# Patient Record
Sex: FEMALE | Race: WHITE | NOT HISPANIC OR LATINO | Employment: FULL TIME | URBAN - METROPOLITAN AREA
[De-identification: names, ages, dates, MRNs, and addresses within clinical notes are randomized per-mention and may not be internally consistent; named-entity substitution may affect disease eponyms.]

---

## 2023-05-02 ENCOUNTER — OFFICE VISIT (OUTPATIENT)
Dept: PRIMARY CARE | Facility: CLINIC | Age: 43
End: 2023-05-02
Payer: COMMERCIAL

## 2023-05-02 VITALS
TEMPERATURE: 97.5 F | DIASTOLIC BLOOD PRESSURE: 74 MMHG | WEIGHT: 193 LBS | OXYGEN SATURATION: 98 % | BODY MASS INDEX: 32.95 KG/M2 | SYSTOLIC BLOOD PRESSURE: 114 MMHG | HEART RATE: 67 BPM | HEIGHT: 64 IN

## 2023-05-02 DIAGNOSIS — H60.501 ACUTE OTITIS EXTERNA OF RIGHT EAR, UNSPECIFIED TYPE: Primary | ICD-10-CM

## 2023-05-02 PROBLEM — S62.328A CLOSED FRACTURE OF SHAFT OF FOURTH METACARPAL BONE: Status: ACTIVE | Noted: 2023-05-02

## 2023-05-02 PROBLEM — H81.10 BENIGN POSITIONAL VERTIGO: Status: ACTIVE | Noted: 2023-05-02

## 2023-05-02 PROBLEM — G62.9 NEUROPATHY: Status: ACTIVE | Noted: 2023-05-02

## 2023-05-02 PROBLEM — Z98.84 S/P LAPAROSCOPIC SLEEVE GASTRECTOMY: Status: ACTIVE | Noted: 2023-05-02

## 2023-05-02 PROBLEM — G43.009 COMMON MIGRAINE WITHOUT AURA: Status: ACTIVE | Noted: 2023-05-02

## 2023-05-02 PROBLEM — M79.671 CHRONIC PAIN OF RIGHT HEEL: Status: ACTIVE | Noted: 2023-05-02

## 2023-05-02 PROBLEM — K21.9 ESOPHAGEAL REFLUX: Status: ACTIVE | Noted: 2023-05-02

## 2023-05-02 PROBLEM — S01.332A: Status: ACTIVE | Noted: 2023-05-02

## 2023-05-02 PROBLEM — S01.331A: Status: ACTIVE | Noted: 2023-05-02

## 2023-05-02 PROBLEM — E55.9 VITAMIN D DEFICIENCY: Status: ACTIVE | Noted: 2023-05-02

## 2023-05-02 PROBLEM — G89.29 CHRONIC PAIN OF RIGHT HEEL: Status: ACTIVE | Noted: 2023-05-02

## 2023-05-02 PROBLEM — G47.33 OBSTRUCTIVE SLEEP APNEA, ADULT: Status: ACTIVE | Noted: 2023-05-02

## 2023-05-02 PROBLEM — Z98.84 BARIATRIC SURGERY STATUS: Status: ACTIVE | Noted: 2023-05-02

## 2023-05-02 PROBLEM — E03.9 ACQUIRED HYPOTHYROIDISM: Status: ACTIVE | Noted: 2023-05-02

## 2023-05-02 PROBLEM — F54 PSYCHOLOGICAL FACTOR AFFECTING PHYSICAL CONDITION: Status: ACTIVE | Noted: 2023-05-02

## 2023-05-02 PROBLEM — G44.221 CHRONIC TENSION-TYPE HEADACHE, INTRACTABLE: Status: ACTIVE | Noted: 2023-05-02

## 2023-05-02 PROBLEM — R53.83 FATIGUE: Status: ACTIVE | Noted: 2023-05-02

## 2023-05-02 PROBLEM — L25.5 RHUS DERMATITIS: Status: ACTIVE | Noted: 2023-05-02

## 2023-05-02 PROCEDURE — 1036F TOBACCO NON-USER: CPT | Performed by: INTERNAL MEDICINE

## 2023-05-02 PROCEDURE — 99213 OFFICE O/P EST LOW 20 MIN: CPT | Performed by: INTERNAL MEDICINE

## 2023-05-02 RX ORDER — NEOMYCIN SULFATE, POLYMYXIN B SULFATE, HYDROCORTISONE 3.5; 10000; 1 MG/ML; [USP'U]/ML; MG/ML
2 SOLUTION/ DROPS AURICULAR (OTIC) 4 TIMES DAILY
Qty: 10 ML | Refills: 0 | Status: SHIPPED | OUTPATIENT
Start: 2023-05-02 | End: 2023-05-09

## 2023-05-02 RX ORDER — ACETAMINOPHEN 500 MG
TABLET ORAL
COMMUNITY
Start: 2021-09-23

## 2023-05-02 RX ORDER — OMEPRAZOLE 20 MG/1
20 CAPSULE, DELAYED RELEASE ORAL
COMMUNITY
Start: 2022-08-02

## 2023-05-02 RX ORDER — BIOTIN 10 MG
TABLET ORAL
COMMUNITY

## 2023-05-02 RX ORDER — PNV NO.95/FERROUS FUM/FOLIC AC 28MG-0.8MG
TABLET ORAL
COMMUNITY
Start: 2021-04-22

## 2023-05-02 RX ORDER — MULTIVIT-MIN/IRON FUM/FOLIC AC 7.5 MG-4
1 TABLET ORAL DAILY
COMMUNITY

## 2023-05-02 NOTE — PROGRESS NOTES
"The patient is here for:   Chief Complaint   Patient presents with    RIGHT EAR ANDS JAW PAIN    Annual Exam        I have reviewed existing histories, notes, past medical history, surgical history, social history, family history, med list, allergy list, and immunization, and updated if applicable.  PCP: Rani Beck MD    HPI:   right ear pain for 1.5 weeks. She scratched her ear quite hard and then pain started. It drained some. And decreased hearing.  It did start to feel better but she wanted to get checked any way.    Lab Results   Component Value Date    WBC 7.1 03/25/2022    HGB 13.6 03/25/2022    HCT 42.6 03/25/2022     03/25/2022    CHOL 237 (H) 05/25/2021    TRIG 107 05/25/2021    HDL 56.3 05/25/2021    ALT 16 03/25/2022    AST 18 03/25/2022     03/25/2022    K 4.6 03/25/2022     03/25/2022    CREATININE 0.81 03/25/2022    BUN 11 03/25/2022    CO2 28 03/25/2022    TSH 3.51 03/25/2022    INR 1.0 05/25/2021    HGBA1C 5.3 03/25/2022       Physical exam:  /74   Pulse 67   Temp 36.4 °C (97.5 °F)   Ht 1.632 m (5' 4.25\")   Wt 87.5 kg (193 lb)   SpO2 98%   BMI 32.87 kg/m²    left ear is normal  right ear canal is slightly red  TM normal     Assessments/orders:   1. Acute otitis externa of right ear, unspecified type    - neomycin-polymyxin-HC (Cortisporin) otic solution; Administer 2 drops into the right ear 4 times a day for 7 days.  Dispense: 10 mL; Refill: 0      Plan/discussion and follow up:   Follow up as needed           "

## 2023-12-13 ENCOUNTER — TELEMEDICINE (OUTPATIENT)
Dept: PRIMARY CARE | Facility: CLINIC | Age: 43
End: 2023-12-13
Payer: COMMERCIAL

## 2023-12-13 DIAGNOSIS — R05.1 ACUTE COUGH: ICD-10-CM

## 2023-12-13 DIAGNOSIS — J40 BRONCHITIS: Primary | ICD-10-CM

## 2023-12-13 PROCEDURE — 99213 OFFICE O/P EST LOW 20 MIN: CPT

## 2023-12-13 RX ORDER — AZITHROMYCIN 250 MG/1
TABLET, FILM COATED ORAL
Qty: 6 TABLET | Refills: 0 | Status: SHIPPED | OUTPATIENT
Start: 2023-12-13 | End: 2023-12-18

## 2023-12-13 RX ORDER — BENZONATATE 100 MG/1
100 CAPSULE ORAL 3 TIMES DAILY PRN
Qty: 42 CAPSULE | Refills: 0 | Status: SHIPPED | OUTPATIENT
Start: 2023-12-13 | End: 2024-01-12

## 2023-12-13 ASSESSMENT — ENCOUNTER SYMPTOMS
RHINORRHEA: 1
COUGH: 1
CHILLS: 0
WEIGHT LOSS: 0
HEMOPTYSIS: 0
SORE THROAT: 1
HEADACHES: 1
HEARTBURN: 0
WHEEZING: 0
FEVER: 0

## 2023-12-13 NOTE — PROGRESS NOTES
This visit was completed via video conference. All issues as below were discussed and addressed but no physical exam was performed. If it was felt that the patient should be evaluated in clinic than they were directed there. The patient verbally consented to the visit.    Subjective   Patient ID: Sandrita Mixon is a 42 y.o. female who presents for Cough.    Cough  This is a new problem. Episode onset: 5days ago. The problem has been gradually worsening. The problem occurs constantly. The cough is Productive of purulent sputum. Associated symptoms include chest pain, headaches, nasal congestion, rhinorrhea and a sore throat. Pertinent negatives include no chills, ear congestion, ear pain, fever, heartburn, hemoptysis, postnasal drip, rash, weight loss or wheezing. Nothing aggravates the symptoms. Treatments tried: dayquil cold and flu. The treatment provided no relief.        Review of Systems   Constitutional:  Negative for chills, fever and weight loss.   HENT:  Positive for rhinorrhea and sore throat. Negative for ear pain and postnasal drip.    Respiratory:  Positive for cough. Negative for hemoptysis and wheezing.    Cardiovascular:  Positive for chest pain.   Gastrointestinal:  Negative for heartburn.   Skin:  Negative for rash.   Neurological:  Positive for headaches.       Objective   There were no vitals taken for this visit.    Physical Exam pt seen from her home to be in no acute distress      Assessment/Plan   Problem List Items Addressed This Visit    None  Visit Diagnoses         Codes    Bronchitis    -  Primary J40    Relevant Medications    benzonatate (Tessalon) 100 mg capsule    azithromycin (Zithromax) 250 mg tablet    Acute cough     R05.1    Relevant Medications    benzonatate (Tessalon) 100 mg capsule

## 2023-12-13 NOTE — PATIENT INSTRUCTIONS
Drinking plenty of fluids and getting lots of rest. Chicken soup and hot beverages may help.    Trial of nasal irrigation with a Nettipot or squeeze bottle with sterile salt water.    Nasal spray corticosteroids (Flonase) may help in reducing the inflammatory response in the nasal passages and airways. Please try 2 sprays each nostril daily for 2 weeks.  If you have season allergies, please take a daily antihistamine of your choice, such as Zyrtec/Claritin/Allegra at bedtime.    Take Tylenol or Motrin/Aleve for sinus or ear pain.    If you have good blood pressure you can try pseudofed (you must ask the pharmacist for it and show ID).    Please call or return to the office if you are not feeling better in the next 3-4 days after starting treatment.

## 2024-02-09 ENCOUNTER — APPOINTMENT (OUTPATIENT)
Dept: PRIMARY CARE | Facility: CLINIC | Age: 44
End: 2024-02-09
Payer: COMMERCIAL

## 2024-09-19 ENCOUNTER — HOSPITAL ENCOUNTER (OUTPATIENT)
Dept: RADIOLOGY | Facility: CLINIC | Age: 44
Discharge: HOME | End: 2024-09-19
Payer: COMMERCIAL

## 2024-09-19 VITALS — HEIGHT: 64 IN | BODY MASS INDEX: 32.93 KG/M2 | WEIGHT: 192.9 LBS

## 2024-09-19 DIAGNOSIS — Z12.31 SCREENING MAMMOGRAM FOR BREAST CANCER: ICD-10-CM

## 2024-09-19 PROCEDURE — 77067 SCR MAMMO BI INCL CAD: CPT

## 2024-10-02 ENCOUNTER — TELEMEDICINE (OUTPATIENT)
Dept: PRIMARY CARE | Facility: CLINIC | Age: 44
End: 2024-10-02
Payer: COMMERCIAL

## 2024-10-02 DIAGNOSIS — U07.1 COVID-19: Primary | ICD-10-CM

## 2024-10-02 PROCEDURE — 99213 OFFICE O/P EST LOW 20 MIN: CPT | Performed by: NURSE PRACTITIONER

## 2024-10-02 RX ORDER — NIRMATRELVIR AND RITONAVIR 300-100 MG
3 KIT ORAL 2 TIMES DAILY
Qty: 30 TABLET | Refills: 0 | Status: SHIPPED | OUTPATIENT
Start: 2024-10-02

## 2024-10-02 ASSESSMENT — ENCOUNTER SYMPTOMS
NAUSEA: 0
FATIGUE: 1
CHILLS: 1
WHEEZING: 0
DIARRHEA: 0
ARTHRALGIAS: 0
CHEST TIGHTNESS: 0
TROUBLE SWALLOWING: 0
MYALGIAS: 1
SORE THROAT: 1
DIAPHORESIS: 0
HEADACHES: 1
LIGHT-HEADEDNESS: 0
DIZZINESS: 0
RHINORRHEA: 1
FEVER: 0
VOMITING: 0
APPETITE CHANGE: 1
COUGH: 1
ACTIVITY CHANGE: 0

## 2024-10-02 NOTE — PROGRESS NOTES
Subjective   Patient ID: Sandrita Mixon is a 43 y.o. female who presents for COVID 19.    Sx onset: Monday  Tested +: yesterday  Sx include: sore throat, HA, runny nose, cough, chills, nasal congestion, body aches.  Taking OTC pain relievers, cold/flu medication  Vaccinated?: yes, boosters   Previous infection? No           Review of Systems   Constitutional:  Positive for appetite change, chills and fatigue. Negative for activity change, diaphoresis and fever.   HENT:  Positive for congestion, rhinorrhea and sore throat. Negative for trouble swallowing.    Respiratory:  Positive for cough. Negative for chest tightness and wheezing.    Cardiovascular:  Negative for chest pain.   Gastrointestinal:  Negative for diarrhea, nausea and vomiting.   Musculoskeletal:  Positive for myalgias. Negative for arthralgias.   Neurological:  Positive for headaches. Negative for dizziness and light-headedness.       Objective   There were no vitals taken for this visit.    Physical Exam  Constitutional:       General: She is not in acute distress.     Appearance: Normal appearance. She is ill-appearing.      Comments: On Demand Virtual Visit Patient Consent     An interactive audio and video telecommunication system which permits real time communications between the patient (at the originating site) and provider (at the distant site) was utilized to provide this telehealth service.   Verbal consent was requested and obtained from Sandrita Mixon (or parent if under 18) on this date, 03/27/24 for a telehealth visit.   I have verbally confirmed with Sandrita Mixon (or parent if under 18) that they are physically located in the [facility] during this virtual visit.    I performed this visit using realtime telehealth tools, including an audio/video OR telephone connection between the patient listed who was located in the [facility] and myself, Tanner Tanner CNP (licensed in the [facility]).  At the start of the visit, I  introduced myself as Tanner Tanner, Nurse practitioner and verified the patients name, , and current physical location.    If they were currently outside of the state of [place], the visit was ended and the patient was referred to alternative means for evaluation and treatment.   The patient was made aware of the limitations of the telehealth visit.  They will not be physically examined and all issues may not be appropriate for a telehealth visit.  If necessary, an in person referral will be made.       DISCLAIMER:   In preparing for this visit and writing this note, I reviewed previous electronic medical records (labs, imaging and medical charts) available.  Significant findings which helped in decision making are recorded in this encounter charting.    Telemedicine appropriate evaluation completed.  Unable to perform complete physical exam due to virtual visit, patient was visualized on interactive video.      Pulmonary:      Effort: Pulmonary effort is normal.   Neurological:      Mental Status: She is alert and oriented to person, place, and time.         Assessment/Plan   Diagnoses and all orders for this visit:  COVID-19  -     nirmatrelvir-ritonavir (Paxlovid) 300 mg (150 mg x 2)-100 mg tablet therapy pack; Take 3 tablets by mouth 2 times a day. TAKE AS DIRECTED (300mg nirmatrelvir/100mg ritonavir) TWICE DAILY  Prescribed Paxlovid, Use OTC Tylenol as needed for pain/fever and Mucinex or Robitussin as needed for cough/congestion  Follow CDC guidelines for quarantine and masking.     seek emergency care if having CP or difficulty breathing.  Follow up as needed

## 2024-10-02 NOTE — PATIENT INSTRUCTIONS
Pleasure meeting with you today!    Let me know if you need anything.     Please send me a MyChart message if you have any questions or concerns.  FOR NON URGENT questions only.  Allow up to 72 hours for response.     If you have prescription issues or other questions you can email   Cheryl Flannery,  Digital Health Coordinator, at   [email or web address]

## 2024-10-06 ENCOUNTER — OFFICE VISIT (OUTPATIENT)
Dept: URGENT CARE | Age: 44
End: 2024-10-06
Payer: COMMERCIAL

## 2024-10-06 VITALS
SYSTOLIC BLOOD PRESSURE: 138 MMHG | OXYGEN SATURATION: 98 % | WEIGHT: 204 LBS | HEART RATE: 64 BPM | RESPIRATION RATE: 16 BRPM | BODY MASS INDEX: 34.74 KG/M2 | DIASTOLIC BLOOD PRESSURE: 90 MMHG

## 2024-10-06 DIAGNOSIS — H65.01 NON-RECURRENT ACUTE SEROUS OTITIS MEDIA OF RIGHT EAR: Primary | ICD-10-CM

## 2024-10-06 PROCEDURE — 99203 OFFICE O/P NEW LOW 30 MIN: CPT | Performed by: NURSE PRACTITIONER

## 2024-10-06 PROCEDURE — 1036F TOBACCO NON-USER: CPT | Performed by: NURSE PRACTITIONER

## 2024-10-06 RX ORDER — AMOXICILLIN 875 MG/1
875 TABLET, FILM COATED ORAL 2 TIMES DAILY
Qty: 20 TABLET | Refills: 0 | Status: SHIPPED | OUTPATIENT
Start: 2024-10-06 | End: 2024-10-16

## 2024-10-06 ASSESSMENT — ENCOUNTER SYMPTOMS
CARDIOVASCULAR NEGATIVE: 1
GASTROINTESTINAL NEGATIVE: 1
SORE THROAT: 0
SINUS PAIN: 1
EYES NEGATIVE: 1
RESPIRATORY NEGATIVE: 1
CONSTITUTIONAL NEGATIVE: 1
SINUS PRESSURE: 1
RHINORRHEA: 1

## 2024-10-06 NOTE — PROGRESS NOTES
Subjective   Patient ID: Sandrita Mixon is a 43 y.o. female. They present today with a chief complaint of ear discomfort (Pt c/o b/l ear discomfort and eye discharge and irritation x2 days, pt currently covid +, began paxlovid for treatment x3 days ago and has since d/c'd use due to new symptoms).    History of Present Illness  Patient presents with congestion and bilateral ear drainage and pain.  States she tested positive for COVID on Tuesday and did a Telehealth.  She was prescribed Paxlovid.  States she developed red, crusty, swollen eyes after taking the Paxlovid so she stoppd.  Today she c/o congestion with ear issues.     Past Medical History  Allergies as of 10/06/2024    (No Known Allergies)       (Not in a hospital admission)       Past Medical History:   Diagnosis Date    Body mass index (BMI) 35.0-35.9, adult 2016    BMI 35.0-35.9,adult    Body mass index (BMI) 37.0-37.9, adult 2022    BMI 37.0-37.9, adult    Fibrocystic breast 2018    Other conditions influencing health status 2016    History of frequent headaches    Other obesity due to excess calories 2022    Class 2 obesity due to excess calories without serious comorbidity with body mass index (BMI) of 37.0 to 37.9 in adult    Personal history of diseases of the skin and subcutaneous tissue 2014    History of folliculitis    Personal history of other endocrine, nutritional and metabolic disease 2014    History of obesity    Personal history of other endocrine, nutritional and metabolic disease 2016    History of obesity    Personal history of other specified conditions 2014    History of polyuria    Personal history of other specified conditions 10/07/2013    History of numbness    Personal history of other specified conditions 2015    History of abdominal pain    Polyphagia 2014    Polyphagia       Past Surgical History:   Procedure Laterality Date     SECTION, CLASSIC   2018     Section     SECTION, CLASSIC      OTHER SURGICAL HISTORY  2021    Hand surgery    OTHER SURGICAL HISTORY  2018    Tympanic Membrane Repair - Right Ear    STOMACH SURGERY  2018    Gastric Surgery    TUBAL LIGATION  2018    Tubal Ligation        reports that she has never smoked. She has never used smokeless tobacco. She reports current alcohol use of about 2.0 standard drinks of alcohol per week. She reports that she does not use drugs.    Review of Systems  Review of Systems   Constitutional: Negative.    HENT:  Positive for congestion, ear discharge, ear pain, postnasal drip, rhinorrhea, sinus pressure and sinus pain. Negative for sore throat.    Eyes: Negative.    Respiratory: Negative.     Cardiovascular: Negative.    Gastrointestinal: Negative.                                   Objective    Vitals:    10/06/24 0847   BP: 138/90   Pulse: 64   Resp: 16   SpO2: 98%   Weight: 92.5 kg (204 lb)     No LMP recorded.    Physical Exam    CONSTITUTIONAL: The general appearance and condition of the patient were examined.  Level of distress, nutrition, external development abnormality, and general behavior were noted.  No abnormal findings.  Vital signs as documented.      EARS: External appearance normal-no lesions, redness, or swelling. Mild discomfort during palpation; on otoscopic exam tympanic membranes and inner ear are red, and fluid is also noted behind the tympanic membrane. Hearing is intact.     CARDIOVASCULAR: The patient's heart examined for regular rate and rhythm and presence of murmurs. Note taken of any tachycardia, bradycardia or any irregular rhythm.  No abnormal findings.        RESPIRATORY/LUNGS: Chest examined for equal movement, bilaterally.  Lungs examined for equality of breath sounds. Presence or absence of rales noted bilaterally.  Examined for the presence of diffuse or scattered wheezes.  No abnormal findings.      GI/ABDOMEN: The patient's  abdomen was inspected for signs of distention, surgical scars, or ascites.  Bowel sounds were evaluated.  The patient's abdomen was then palpated in each of four quadrants, including palpation for RLQ tenderness, any rebound or tenderness.  No abnormal findings.       Procedures    Point of Care Test & Imaging Results from this visit  No results found for this visit on 10/06/24.   No results found.    Diagnostic study results (if any) were reviewed by [facility].    Assessment/Plan   Allergies, medications, history, and pertinent labs/EKGs/Imaging reviewed by BERRY Mcnulty-CNP.     Medical Decision Making  At time of discharge patient was clinically well-appearing and HDS for outpatient management. The patient and/or family was educated regarding diagnosis, supportive care, OTC and Rx medications. The patient and/or family was given the opportunity to ask questions prior to discharge.  They verbalized understanding of my discussion of the plans for treatment, expected course, indications to return to  or seek further evaluation in ED, and the need for timely follow up as directed.   They were provided with a work/school excuse if requested.      Orders and Diagnoses  There are no diagnoses linked to this encounter.    Medical Admin Record      Patient disposition: Home    Electronically signed by [facility]  8:48 AM

## 2024-10-17 ENCOUNTER — LAB (OUTPATIENT)
Dept: LAB | Facility: LAB | Age: 44
End: 2024-10-17

## 2024-10-17 ENCOUNTER — APPOINTMENT (OUTPATIENT)
Dept: PRIMARY CARE | Facility: CLINIC | Age: 44
End: 2024-10-17
Payer: COMMERCIAL

## 2024-10-17 VITALS
DIASTOLIC BLOOD PRESSURE: 62 MMHG | HEIGHT: 65 IN | SYSTOLIC BLOOD PRESSURE: 101 MMHG | BODY MASS INDEX: 34.66 KG/M2 | WEIGHT: 208 LBS | HEART RATE: 75 BPM | OXYGEN SATURATION: 98 % | TEMPERATURE: 98.6 F

## 2024-10-17 DIAGNOSIS — R00.0 TACHYCARDIA: Primary | ICD-10-CM

## 2024-10-17 DIAGNOSIS — H66.001 NON-RECURRENT ACUTE SUPPURATIVE OTITIS MEDIA OF RIGHT EAR WITHOUT SPONTANEOUS RUPTURE OF TYMPANIC MEMBRANE: ICD-10-CM

## 2024-10-17 DIAGNOSIS — R00.0 TACHYCARDIA: ICD-10-CM

## 2024-10-17 LAB
ALBUMIN SERPL BCP-MCNC: 4.3 G/DL (ref 3.4–5)
ALP SERPL-CCNC: 60 U/L (ref 33–110)
ALT SERPL W P-5'-P-CCNC: 16 U/L (ref 7–45)
ANION GAP SERPL CALC-SCNC: 14 MMOL/L (ref 10–20)
AST SERPL W P-5'-P-CCNC: 21 U/L (ref 9–39)
BILIRUB SERPL-MCNC: 0.2 MG/DL (ref 0–1.2)
BUN SERPL-MCNC: 16 MG/DL (ref 6–23)
CALCIUM SERPL-MCNC: 9.6 MG/DL (ref 8.6–10.6)
CHLORIDE SERPL-SCNC: 102 MMOL/L (ref 98–107)
CO2 SERPL-SCNC: 27 MMOL/L (ref 21–32)
CREAT SERPL-MCNC: 0.79 MG/DL (ref 0.5–1.05)
EGFRCR SERPLBLD CKD-EPI 2021: >90 ML/MIN/1.73M*2
ERYTHROCYTE [DISTWIDTH] IN BLOOD BY AUTOMATED COUNT: 15.7 % (ref 11.5–14.5)
EST. AVERAGE GLUCOSE BLD GHB EST-MCNC: 111 MG/DL
GLUCOSE SERPL-MCNC: 90 MG/DL (ref 74–99)
HBA1C MFR BLD: 5.5 %
HCT VFR BLD AUTO: 36.8 % (ref 36–46)
HGB BLD-MCNC: 11.2 G/DL (ref 12–16)
MCH RBC QN AUTO: 24.9 PG (ref 26–34)
MCHC RBC AUTO-ENTMCNC: 30.4 G/DL (ref 32–36)
MCV RBC AUTO: 82 FL (ref 80–100)
NRBC BLD-RTO: 0 /100 WBCS (ref 0–0)
PLATELET # BLD AUTO: 273 X10*3/UL (ref 150–450)
POTASSIUM SERPL-SCNC: 5 MMOL/L (ref 3.5–5.3)
PROT SERPL-MCNC: 7 G/DL (ref 6.4–8.2)
RBC # BLD AUTO: 4.5 X10*6/UL (ref 4–5.2)
SODIUM SERPL-SCNC: 138 MMOL/L (ref 136–145)
TSH SERPL-ACNC: 3.51 MIU/L (ref 0.44–3.98)
WBC # BLD AUTO: 7.5 X10*3/UL (ref 4.4–11.3)

## 2024-10-17 PROCEDURE — 99214 OFFICE O/P EST MOD 30 MIN: CPT | Performed by: INTERNAL MEDICINE

## 2024-10-17 PROCEDURE — 84443 ASSAY THYROID STIM HORMONE: CPT

## 2024-10-17 PROCEDURE — 93000 ELECTROCARDIOGRAM COMPLETE: CPT | Performed by: INTERNAL MEDICINE

## 2024-10-17 PROCEDURE — 82746 ASSAY OF FOLIC ACID SERUM: CPT

## 2024-10-17 PROCEDURE — 82728 ASSAY OF FERRITIN: CPT

## 2024-10-17 PROCEDURE — 85027 COMPLETE CBC AUTOMATED: CPT

## 2024-10-17 PROCEDURE — 3008F BODY MASS INDEX DOCD: CPT | Performed by: INTERNAL MEDICINE

## 2024-10-17 PROCEDURE — 82607 VITAMIN B-12: CPT

## 2024-10-17 PROCEDURE — 83036 HEMOGLOBIN GLYCOSYLATED A1C: CPT

## 2024-10-17 PROCEDURE — 80053 COMPREHEN METABOLIC PANEL: CPT

## 2024-10-17 PROCEDURE — 36415 COLL VENOUS BLD VENIPUNCTURE: CPT

## 2024-10-17 PROCEDURE — 83550 IRON BINDING TEST: CPT

## 2024-10-17 PROCEDURE — 83540 ASSAY OF IRON: CPT

## 2024-10-17 RX ORDER — AZITHROMYCIN 250 MG/1
TABLET, FILM COATED ORAL
Qty: 6 TABLET | Refills: 0 | Status: SHIPPED | OUTPATIENT
Start: 2024-10-17 | End: 2024-10-22

## 2024-10-17 NOTE — PROGRESS NOTES
"PCP: Rani Beck MD   I have reviewed existing histories, notes, past medical history, surgical history, social history, family history, med list, allergy list, and immunization, and updated.    HPI:   For two things;    Tachycardia, and right ear pain draining.    She was seen in urgent care 10/6, for sinusitis. Given amox, which she finished. Helped with all other symptoms but right ear continued to bother her some. Lately noticed some draining, and feels plugged.  No Fever and chills     Since summer intermittently notices rapid heart rates, may last up to 30 minutes. Sometime related to exertion, sometime not.   Heart rate goes up to 130's at rest. Another time while biking, heart rate went up to 180.    No other symptoms    Gained weight this past year due to more sedentary job.  Had bariatric surgery 3 years ago  Her weight before surgery was 287.  Hx of high cholesterol.  Healthy otherwise       Lab Results   Component Value Date    WBC 7.1 03/25/2022    HGB 13.6 03/25/2022    HCT 42.6 03/25/2022     03/25/2022    CHOL 237 (H) 05/25/2021    TRIG 107 05/25/2021    HDL 56.3 05/25/2021    ALT 16 03/25/2022    AST 18 03/25/2022     03/25/2022    K 4.6 03/25/2022     03/25/2022    CREATININE 0.81 03/25/2022    BUN 11 03/25/2022    CO2 28 03/25/2022    TSH 3.51 03/25/2022    INR 1.0 05/25/2021    HGBA1C 5.3 03/25/2022     Physical exam:  /62   Pulse 75   Temp 37 °C (98.6 °F)   Ht 1.638 m (5' 4.5\")   Wt 94.3 kg (208 lb)   SpO2 98%   BMI 35.15 kg/m²     Patient appears well, alert and oriented x 3, cooperative.     Neck is supple and free of adenopathy, or masses. No thyromegaly.     left ear normal. right ear serious otitis media noted. No Otitis externa   Heart sounds are normal, no murmur, gallops or rubs.   Lungs are clear to auscultation    Abdomen is soft, no tenderness, masses or organomegaly.  Extremities are normal. Peripheral pulses are normal.   Neurologic exam is normal " without focal findings.   Skin is normal without suspicious lesions noted.   No acute joint swelling or pain.  No leg edema    Assessments/orders:   Assessment & Plan  Tachycardia  Get labs first. May need to see cardiologist, cardiac monitor  Orders:    ECG 12 lead (Clinic Performed)    Hemoglobin A1C; Future    CBC; Future    Comprehensive Metabolic Panel; Future    TSH with reflex to Free T4 if abnormal; Future    Non-recurrent acute suppurative otitis media of right ear without spontaneous rupture of tympanic membrane    Orders:    azithromycin (Zithromax) 250 mg tablet; Take 2 tablets (500 mg) by mouth once daily for 1 day, THEN 1 tablet (250 mg) once daily for 4 days. Take 2 tabs (500 mg) by mouth today, than 1 daily for 4 days..

## 2024-10-21 DIAGNOSIS — D64.9 ANEMIA, UNSPECIFIED TYPE: Primary | ICD-10-CM

## 2024-10-21 LAB
FERRITIN SERPL-MCNC: 13 NG/ML (ref 8–150)
FOLATE SERPL-MCNC: 12.3 NG/ML
IRON SATN MFR SERPL: 4 % (ref 25–45)
IRON SERPL-MCNC: 17 UG/DL (ref 35–150)
TIBC SERPL-MCNC: 425 UG/DL (ref 240–445)
UIBC SERPL-MCNC: 408 UG/DL (ref 110–370)
VIT B12 SERPL-MCNC: 423 PG/ML (ref 211–911)

## 2024-10-22 ENCOUNTER — PATIENT MESSAGE (OUTPATIENT)
Dept: PRIMARY CARE | Facility: CLINIC | Age: 44
End: 2024-10-22
Payer: COMMERCIAL

## 2024-10-22 DIAGNOSIS — H66.90 OTITIS MEDIA, UNSPECIFIED LATERALITY, UNSPECIFIED OTITIS MEDIA TYPE: Primary | ICD-10-CM

## 2024-10-24 NOTE — PROGRESS NOTES
"    ADULT AUDIOMETRIC EVALUATION      Name:  Sandrita Mixon  :  1980  Age:  43 y.o.  Date of Evaluation:  ***    IMPRESSIONS     Today's test results are consistent with ***. Discussed results and recommendations with patient.  Questions were addressed and the patient was encouraged to contact our department should concerns arise.    RECOMMENDATIONS     Continue medical follow up with PCP/ENT.  Return for evaluation following any medical management.     Time: ***    HISTORY     Sandrita Mixon is seen today in conjunction with ENT appointment. Patient reported ***. Patient denied history of tinnitus, dizziness, aural fullness, or excessive noise exposure. No history of hearing aid use.    TEST RESULTS     Otoscopic Evaluation:  Physical exam to evaluate the outer ear  Right Ear: {CERUMEN:68710}  Left Ear: {CERUMEN:79394}    Tympanometry & Acoustic Reflexes:  Assesses the function of the middle ear and inner ear structures  Right Ear: {TYMPSC:34639::\"Tympanometry revealed normal ear canal volume, peak pressure, and compliance, consistent with normal middle ear function (Type A).\"} {REFLEXSC:77398::\"Ipsilateral Acoustic Reflexes were present *** Hz.\"} {Retro (Optional):35998}  Left Ear: {TYMPSC:17931::\"Tympanometry revealed normal ear canal volume, peak pressure, and compliance, consistent with normal middle ear function (Type A).\"} {REFLEXSC:77502::\"Ipsilateral Acoustic Reflexes were present *** Hz.\"} {Retro (Optional):31675}    Distortion Product Otoacoustic Emissions: Assesses the cochlear outer hair cell function (6408-6319 Hz frequency range)  Right Ear: {OAE:53514}  Left Ear:  {OAE:09776}    Pure Tone Audiometry: {METHODSC:52460} via {TRANSDUCERSC:74898} with {RELIABLESC:69963} reliability  Right Ear: ***  Left Ear: ***    Speech Audiometry:   Right Ear: {AUD SRT/SAT:50880}. Speech reception threshold in agreement with pure tone average. Word Recognition scores were {DESC; " EXCELLENT/GOOD/FAIR:14739} (***%) in quiet when words were presented at *** dBHL.   Left Ear: {PITER SRT/SAT:97045}. Speech reception threshold in agreement with pure tone average. Word Recognition scores were {DESC; EXCELLENT/GOOD/FAIR:86665} (***%) in quiet when words were presented at *** dBHL.       Testing and interpretation of results completed Piter Toussaint CCC-A. It was my pleasure to evaluate this patient.       Piter Toussaint, CCC-A  Senior Clinical Vestibular Audiologist    Degree of Hearing Sensitivity Decibel Range   Within Normal Limits (WNL) 0-25   Mild 26-40   Moderate 41-55   Moderately-Severe 56-70   Severe 71-90   Profound 91+      Key   CNT/DNT Could Not Test/Did Not Test   TM Tympanic Membrane   WNL Within Normal Limits   HA Hearing Aid   SNHL Sensorineural Hearing Loss   CHL Conductive Hearing Loss   NIHL Noise-Induced Hearing Loss   ECV Ear Canal Volume   RE/LE Right Ear/Left Ear        AUDIOGRAM

## 2024-10-25 ENCOUNTER — OFFICE VISIT (OUTPATIENT)
Dept: OTOLARYNGOLOGY | Facility: CLINIC | Age: 44
End: 2024-10-25
Payer: COMMERCIAL

## 2024-10-25 ENCOUNTER — APPOINTMENT (OUTPATIENT)
Dept: AUDIOLOGY | Facility: CLINIC | Age: 44
End: 2024-10-25

## 2024-10-25 DIAGNOSIS — H72.91 PERFORATION OF RIGHT TYMPANIC MEMBRANE: ICD-10-CM

## 2024-10-25 DIAGNOSIS — H92.11 OTORRHEA OF RIGHT EAR: Primary | ICD-10-CM

## 2024-10-25 DIAGNOSIS — H66.90 OTITIS MEDIA, UNSPECIFIED LATERALITY, UNSPECIFIED OTITIS MEDIA TYPE: ICD-10-CM

## 2024-10-25 PROCEDURE — 99203 OFFICE O/P NEW LOW 30 MIN: CPT | Performed by: PHYSICIAN ASSISTANT

## 2024-10-25 RX ORDER — CIPROFLOXACIN AND DEXAMETHASONE 3; 1 MG/ML; MG/ML
4 SUSPENSION/ DROPS AURICULAR (OTIC) 2 TIMES DAILY
Qty: 7.5 ML | Refills: 0 | Status: SHIPPED | OUTPATIENT
Start: 2024-10-25 | End: 2024-11-01

## 2024-10-25 NOTE — PROGRESS NOTES
Sandrita Mixon is a 43 y.o. year old female patient with right ear infection and drainage.   Patient presents to the office today for assessment of her right ear.  Patient reports that over the last several weeks she has been experiencing right ear infection.  She has had diminished hearing pain and drainage.  She reports that she has been treated with oral antibiotics but no drops.  She states that she had a similar issue greater than 1 year ago and was given drops but was leaving for vacation and never picked up the prescription.  The patient indicates that she has had previous repair of tympanic membrane but does not recall which ear was involved and states that this was greater than 10 years ago.  She is without other ENT related concerns at this time.         Review of Systems   All other systems reviewed and are negative.      Physical Exam:   General appearance: No acute distress. Normal facies. Symmetric facial movement. No gross lesions of the face are noted.  The external ear structures appear normal. The ear examination of the left ear is normal. The middle ear, tympanic membrane, external auditory canal, and external ear itself are grossly unremarkable without evidence of active disease.  Examination of the patient's right ear is noted for purulent otorrhea.  This was debrided under the otomicroscope.  Patient has a small posterior inferior perforation to the tympanic membrane.  Middle ear space other than otorrhea otherwise appears well.  Anterior rhinoscopy notes essentially a midline nasal septum. Examination is noted for normal healthy mucosal membranes without any evidence of lesions, polyps, or exudate. The tongue is normally mobile. There are no lesions on the gingiva, buccal, or oral mucosa. There are no oral cavity masses.  The neck is negative for mass lymphadenopathy. The trachea and parotid are clear. The thyroid bed is grossly unremarkable. The salivary gland structures are grossly  unremarkable.    Assessment/Plan   1.  Tympanic membrane perforation right ear   2.  Otorrhea     patient seen in the office today for assessment of ears with right tympanic membrane perforation experiencing purulent otorrhea.  Patient advised to practice dry ear precautions and utilize Ciprodex drops and will follow-up in 1 month.        All questions and concerns were answered and addressed. The patient expresses understanding and will follow up as advised.  Thank you again for allowing us to participate in the care of this patient.

## 2024-11-25 ENCOUNTER — APPOINTMENT (OUTPATIENT)
Dept: OTOLARYNGOLOGY | Facility: CLINIC | Age: 44
End: 2024-11-25
Payer: COMMERCIAL

## 2024-12-02 ENCOUNTER — APPOINTMENT (OUTPATIENT)
Dept: OTOLARYNGOLOGY | Facility: CLINIC | Age: 44
End: 2024-12-02
Payer: COMMERCIAL

## 2024-12-02 VITALS — WEIGHT: 210 LBS | HEIGHT: 64 IN | BODY MASS INDEX: 35.85 KG/M2

## 2024-12-02 DIAGNOSIS — H72.91 PERFORATION OF RIGHT TYMPANIC MEMBRANE: ICD-10-CM

## 2024-12-02 DIAGNOSIS — H92.11 OTORRHEA OF RIGHT EAR: Primary | ICD-10-CM

## 2024-12-02 PROCEDURE — 3008F BODY MASS INDEX DOCD: CPT | Performed by: PHYSICIAN ASSISTANT

## 2024-12-02 PROCEDURE — 1036F TOBACCO NON-USER: CPT | Performed by: PHYSICIAN ASSISTANT

## 2024-12-02 PROCEDURE — 99212 OFFICE O/P EST SF 10 MIN: CPT | Performed by: PHYSICIAN ASSISTANT

## 2024-12-02 NOTE — PROGRESS NOTES
Sandrita Mixon is a 43 y.o. year old female patient with Follow-up     The patient returns to the office today for follow-up on right ear.  Patient with previous small tympanic membrane perforation right side with otorrhea.  Patient reports there is no more otorrhea and hearing has returned back to baseline.  She expresses no new ENT concerns today.  There have been no significant changes in past medical or past surgical histories except as mentioned.      Review of Systems   All other systems reviewed and are negative.        Physical Exam:   General appearance: No acute distress. Normal facies. Symmetric facial movement. No gross lesions of the face are noted.  The external ear structures appear normal. The ear canals patent and the tympanic membranes are intact without evidence of air-fluid levels, retraction, or congenital defects.  Anterior rhinoscopy notes essentially a midline nasal septum. Examination is noted for normal healthy mucosal membranes without any evidence of lesions, polyps, or exudate. The tongue is normally mobile. There are no lesions on the gingiva, buccal, or oral mucosa. There are no oral cavity masses.  The neck is negative for mass lymphadenopathy. The trachea and parotid are clear. The thyroid bed is grossly unremarkable. The salivary gland structures are grossly unremarkable.    Assessment/Plan   1.  Resolved tympanic membrane perforation right ear  2.  Resolved otorrhea    Patient seen in the office today for assessment of ears.  Patient with resolved tympanic membrane perforation without otorrhea.  Patient doing very well.  Recommendation for the patient at this time is observation and see me back on an as-needed basis.    All questions and concerns were answered and addressed. The patient expresses understanding and will follow up as advised.    Thank you again for allowing us to participate in the care of this patient.

## 2025-01-08 ENCOUNTER — APPOINTMENT (OUTPATIENT)
Dept: PRIMARY CARE | Facility: CLINIC | Age: 45
End: 2025-01-08
Payer: COMMERCIAL

## 2025-01-08 VITALS
HEIGHT: 64 IN | TEMPERATURE: 98.3 F | SYSTOLIC BLOOD PRESSURE: 110 MMHG | BODY MASS INDEX: 36.02 KG/M2 | HEART RATE: 64 BPM | OXYGEN SATURATION: 98 % | WEIGHT: 211 LBS | DIASTOLIC BLOOD PRESSURE: 72 MMHG

## 2025-01-08 DIAGNOSIS — E66.09 CLASS 2 OBESITY DUE TO EXCESS CALORIES WITHOUT SERIOUS COMORBIDITY WITH BODY MASS INDEX (BMI) OF 36.0 TO 36.9 IN ADULT: ICD-10-CM

## 2025-01-08 DIAGNOSIS — Z00.00 PHYSICAL EXAM: Primary | ICD-10-CM

## 2025-01-08 DIAGNOSIS — D50.8 OTHER IRON DEFICIENCY ANEMIA: ICD-10-CM

## 2025-01-08 DIAGNOSIS — Z98.84 S/P LAPAROSCOPIC SLEEVE GASTRECTOMY: ICD-10-CM

## 2025-01-08 DIAGNOSIS — E66.812 CLASS 2 OBESITY DUE TO EXCESS CALORIES WITHOUT SERIOUS COMORBIDITY WITH BODY MASS INDEX (BMI) OF 36.0 TO 36.9 IN ADULT: ICD-10-CM

## 2025-01-08 DIAGNOSIS — E78.2 MIXED HYPERLIPIDEMIA: ICD-10-CM

## 2025-01-08 PROBLEM — D49.2 NEOPLASM OF UNSPECIFIED BEHAVIOR OF BONE, SOFT TISSUE, AND SKIN: Status: ACTIVE | Noted: 2019-09-24

## 2025-01-08 PROBLEM — S93.409A SPRAIN OF ANKLE: Status: ACTIVE | Noted: 2025-01-08

## 2025-01-08 PROBLEM — G89.18 POSTOPERATIVE PAIN: Status: ACTIVE | Noted: 2025-01-08

## 2025-01-08 PROBLEM — M25.649 STIFFNESS OF HAND JOINT: Status: ACTIVE | Noted: 2025-01-08

## 2025-01-08 PROBLEM — R63.5 ABNORMAL WEIGHT GAIN: Status: ACTIVE | Noted: 2025-01-08

## 2025-01-08 PROBLEM — R69 DISEASE SUSPECTED: Status: ACTIVE | Noted: 2025-01-08

## 2025-01-08 PROBLEM — D22.5 MELANOCYTIC NEVI OF TRUNK: Status: ACTIVE | Noted: 2019-09-24

## 2025-01-08 PROBLEM — Z98.890 POST-OPERATIVE NAUSEA AND VOMITING: Status: ACTIVE | Noted: 2025-01-08

## 2025-01-08 PROBLEM — L57.9 SKIN CHANGES DUE TO CHRONIC EXPOSURE TO NONIONIZING RADIATION, UNSPECIFIED: Status: ACTIVE | Noted: 2019-09-24

## 2025-01-08 PROBLEM — M25.50 ARTHRALGIA OF MULTIPLE JOINTS: Status: ACTIVE | Noted: 2025-01-08

## 2025-01-08 PROBLEM — R11.2 POST-OPERATIVE NAUSEA AND VOMITING: Status: ACTIVE | Noted: 2025-01-08

## 2025-01-08 PROBLEM — M62.838 MUSCLE SPASMS OF NECK: Status: ACTIVE | Noted: 2025-01-08

## 2025-01-08 PROBLEM — D48.5 NEOPLASM OF UNCERTAIN BEHAVIOR OF SKIN: Status: ACTIVE | Noted: 2019-09-24

## 2025-01-08 PROBLEM — M79.643 PAIN OF HAND: Status: ACTIVE | Noted: 2025-01-08

## 2025-01-08 PROCEDURE — 99396 PREV VISIT EST AGE 40-64: CPT | Performed by: INTERNAL MEDICINE

## 2025-01-08 PROCEDURE — 3008F BODY MASS INDEX DOCD: CPT | Performed by: INTERNAL MEDICINE

## 2025-01-08 PROCEDURE — 1036F TOBACCO NON-USER: CPT | Performed by: INTERNAL MEDICINE

## 2025-01-08 NOTE — PROGRESS NOTES
"Rani Beck MD  SUBJECTIVE:     Sandrita Mixon is a 44 y.o. female presenting for her annual physical/wellness.  Doing well, no concerns.  Has hx of bariatric surgery, currently has mild iron def anemia. Trying to take iron, gets GI upset. She will try different brands of iron.   Is taking MVI, vit D also.   I advised her to take b12 1000mcg as well  She is not vegan.    Colon screening: can wait till age 46. Mom has pre-cancerous polyps. No fhx colon cancer  Last pap:  due. Advised to find gyn.  Last mammogram: Up to date   Dexa na  Vaccines due for tdap.   Diet:   healthy in general  Exercises:  regularly  Lab Results   Component Value Date    HGBA1C 5.5 10/17/2024     Lab Results   Component Value Date    CREATININE 0.79 10/17/2024     Lab Results   Component Value Date    WBC 7.5 10/17/2024    HGB 11.2 (L) 10/17/2024    HCT 36.8 10/17/2024    MCV 82 10/17/2024     10/17/2024     Lab Results   Component Value Date    CHOL 237 (H) 05/25/2021    CHOL 205 (H) 07/09/2020    CHOL 210 (H) 03/03/2018     Lab Results   Component Value Date    HDL 56.3 05/25/2021    HDL 59.5 07/09/2020    HDL 61.3 03/03/2018     No results found for: \"LDLCALC\"  Lab Results   Component Value Date    TRIG 107 05/25/2021    TRIG 91 07/09/2020    TRIG 63 03/03/2018     No components found for: \"CHOLHDL\"       ROS:   Feeling well. No dyspnea or chest pain on exertion. No abdominal pain, change in bowel habits, black or bloody stools. No urinary tract or  symptoms.  No breast concerns. No neurological complaints.    OBJECTIVE:   The patient appears well, alert, oriented x 3, in no distress.   /72   Pulse 64   Temp 36.8 °C (98.3 °F)   Ht 1.626 m (5' 4\")   Wt 95.7 kg (211 lb)   SpO2 98%   BMI 36.22 kg/m²   ENT normal.  Neck supple. No adenopathy or thyromegaly. PATEL. Lungs are clear, good air entry, no wheezes, rhonchi or rales. S1 and S2 normal, no murmurs, regular rate and rhythm. Abdomen is soft without tenderness, " guarding, mass or organomegaly.  exam deferred to Gyn. Extremities show no edema, normal peripheral pulses. Neurological is normal without focal findings.    Assessment & Plan  Physical exam         Class 2 obesity due to excess calories without serious comorbidity with body mass index (BMI) of 36.0 to 36.9 in adult         S/P laparoscopic sleeve gastrectomy         Mixed hyperlipidemia         Other iron deficiency anemia

## 2025-01-09 ENCOUNTER — OFFICE VISIT (OUTPATIENT)
Dept: PRIMARY CARE | Facility: CLINIC | Age: 45
End: 2025-01-09
Payer: COMMERCIAL

## 2025-01-09 DIAGNOSIS — Z23 NEED FOR TDAP VACCINATION: Primary | ICD-10-CM

## 2025-01-09 PROCEDURE — 90715 TDAP VACCINE 7 YRS/> IM: CPT | Performed by: INTERNAL MEDICINE

## 2025-01-09 PROCEDURE — 90471 IMMUNIZATION ADMIN: CPT | Performed by: INTERNAL MEDICINE

## 2025-01-09 PROCEDURE — 1036F TOBACCO NON-USER: CPT | Performed by: INTERNAL MEDICINE

## 2025-01-09 NOTE — PROGRESS NOTES
Patient came in today for TDAP . Injection was given in the LEFT deltoid. Injection tolerated well.

## 2025-09-08 ENCOUNTER — APPOINTMENT (OUTPATIENT)
Dept: PRIMARY CARE | Facility: CLINIC | Age: 45
End: 2025-09-08
Payer: COMMERCIAL

## 2025-09-22 ENCOUNTER — APPOINTMENT (OUTPATIENT)
Dept: PRIMARY CARE | Facility: CLINIC | Age: 45
End: 2025-09-22
Payer: COMMERCIAL

## 2026-01-08 ENCOUNTER — APPOINTMENT (OUTPATIENT)
Dept: PRIMARY CARE | Facility: CLINIC | Age: 46
End: 2026-01-08
Payer: COMMERCIAL